# Patient Record
Sex: FEMALE | Race: BLACK OR AFRICAN AMERICAN | NOT HISPANIC OR LATINO | Employment: OTHER | URBAN - METROPOLITAN AREA
[De-identification: names, ages, dates, MRNs, and addresses within clinical notes are randomized per-mention and may not be internally consistent; named-entity substitution may affect disease eponyms.]

---

## 2023-09-28 PROBLEM — M25.50 JOINT PAIN: Status: ACTIVE | Noted: 2023-09-28

## 2023-09-28 PROBLEM — R06.02 SHORTNESS OF BREATH: Status: ACTIVE | Noted: 2023-09-28

## 2023-09-28 PROBLEM — J34.89 SINUS PRESSURE: Status: ACTIVE | Noted: 2023-09-28

## 2023-09-28 PROBLEM — R82.90 ABNORMAL URINE FINDINGS: Status: ACTIVE | Noted: 2023-09-28

## 2023-09-28 PROBLEM — R68.89 COLD INTOLERANCE: Status: ACTIVE | Noted: 2023-09-28

## 2023-09-28 PROBLEM — R53.83 FATIGUE: Status: ACTIVE | Noted: 2023-09-28

## 2023-09-28 PROBLEM — M79.606 LEG PAIN: Status: ACTIVE | Noted: 2023-09-28

## 2023-09-28 PROBLEM — R26.9 IMPAIRED GAIT: Status: ACTIVE | Noted: 2023-09-28

## 2023-09-28 PROBLEM — N81.6 RECTOCELE, FEMALE: Status: ACTIVE | Noted: 2023-09-28

## 2023-09-28 PROBLEM — N18.9 CHRONIC RENAL INSUFFICIENCY: Status: ACTIVE | Noted: 2023-09-28

## 2023-09-28 PROBLEM — Z87.39 HISTORY OF NECK PAIN: Status: ACTIVE | Noted: 2023-09-28

## 2023-09-28 PROBLEM — F32.A DEPRESSION: Status: ACTIVE | Noted: 2023-09-28

## 2023-09-28 PROBLEM — Z82.49 FAMILY HISTORY OF CORONARY ARTERY DISEASE: Status: ACTIVE | Noted: 2023-09-28

## 2023-09-28 PROBLEM — G62.9 PERIPHERAL NEUROPATHY: Status: ACTIVE | Noted: 2023-09-28

## 2023-09-28 PROBLEM — R10.31 GROIN PAIN, RIGHT: Status: ACTIVE | Noted: 2023-09-28

## 2023-09-28 PROBLEM — R32 URINARY INCONTINENCE: Status: ACTIVE | Noted: 2023-09-28

## 2023-09-28 PROBLEM — G90.9 PERIPHERAL AUTONOMIC NEUROPATHY: Status: ACTIVE | Noted: 2023-09-28

## 2023-09-28 PROBLEM — E66.3 OVERWEIGHT: Status: ACTIVE | Noted: 2023-09-28

## 2023-09-28 PROBLEM — R41.3 MEMORY LOSS: Status: ACTIVE | Noted: 2023-09-28

## 2023-09-28 PROBLEM — R26.89 BALANCE PROBLEM: Status: ACTIVE | Noted: 2023-09-28

## 2023-09-28 RX ORDER — VIT C/E/ZN/COPPR/LUTEIN/ZEAXAN 250MG-90MG
25 CAPSULE ORAL
COMMUNITY
Start: 2019-03-18

## 2023-09-28 RX ORDER — DONEPEZIL HYDROCHLORIDE 5 MG/1
1 TABLET, FILM COATED ORAL NIGHTLY
COMMUNITY
Start: 2022-08-15

## 2023-09-28 RX ORDER — ALBUTEROL SULFATE 90 UG/1
1 INHALANT RESPIRATORY (INHALATION) EVERY 6 HOURS
COMMUNITY
Start: 2022-08-15

## 2023-09-28 RX ORDER — ACETAMINOPHEN 500 MG
2 TABLET ORAL EVERY 6 HOURS
COMMUNITY
Start: 2022-08-15

## 2023-09-28 RX ORDER — THIAMINE HCL 50 MG
1 TABLET ORAL DAILY
COMMUNITY
Start: 2022-08-15

## 2023-09-28 RX ORDER — HYDROCHLOROTHIAZIDE 25 MG/1
1 TABLET ORAL DAILY
COMMUNITY

## 2023-09-28 RX ORDER — ASPIRIN 325 MG
1 TABLET ORAL DAILY
COMMUNITY
Start: 2022-08-15

## 2023-09-28 RX ORDER — ATORVASTATIN CALCIUM 40 MG/1
1 TABLET, FILM COATED ORAL NIGHTLY
COMMUNITY

## 2023-10-03 ENCOUNTER — TREATMENT (OUTPATIENT)
Dept: PHYSICAL THERAPY | Facility: CLINIC | Age: 83
End: 2023-10-03
Payer: COMMERCIAL

## 2023-10-03 DIAGNOSIS — R10.31 GROIN PAIN, RIGHT: ICD-10-CM

## 2023-10-03 DIAGNOSIS — R26.9 IMPAIRED GAIT: Primary | ICD-10-CM

## 2023-10-03 PROCEDURE — 97530 THERAPEUTIC ACTIVITIES: CPT | Mod: GP

## 2023-10-03 PROCEDURE — 97110 THERAPEUTIC EXERCISES: CPT | Mod: GP

## 2023-10-03 ASSESSMENT — PAIN SCALES - GENERAL: PAINLEVEL_OUTOF10: 0 - NO PAIN

## 2023-10-03 ASSESSMENT — ENCOUNTER SYMPTOMS
OCCASIONAL FEELINGS OF UNSTEADINESS: 1
LOSS OF SENSATION IN FEET: 0
DEPRESSION: 1

## 2023-10-03 ASSESSMENT — PAIN - FUNCTIONAL ASSESSMENT: PAIN_FUNCTIONAL_ASSESSMENT: 0-10

## 2023-10-03 NOTE — PROGRESS NOTES
Physical Therapy    Physical Therapy Treatment    Patient Name: Marta Butler  MRN: [id number]  Today's Date: 10/3/2023  Time Calculation  Start Time: 1255  Stop Time: 1340  Time Calculation (min): 45 min  Visit 2    Assessment:   Patient returns for first follow up visit after insurance approval for follow up visits. Initiated therapeutic exercises; patient appropriately challenged. Patient ambulates into clinic carrying cane. Gait training and therapeutic exercise for safe mobility. Plan to assess between session carry over at next visit of gait mechanics and sit to stand safety at next visit.    Plan:    Continue per PT POC. Progress therapeutic exercises as patient tolerates.    Current Problem  1. Impaired gait        2. Groin pain, right            Subjective   Using cane mostly when out of the house.     Pain  Pain Assessment: 0-10  Pain Score: 0 - No pain  Pain Type:  (sharp; R groin when moves wrong)    Objective   Ambulates into clinic holding cane    Treatments:  Therapeutic Exercise x 30 mins:  Scifit stepper x 5 mins ROM/warm-up/strength  Seated heel/toe raises 2 x 15 reps  Seated knee extension 2 x 15 reps ea  Seated hip adduction with ball 2 x 10 reps  Seated clamshell with red TB 2 x 10 reps  Seated march with red TB x 10 reps  Therapeutic Activity x 15 mins: Safety and mechanics with functional sit to stand transfer. Sit to stand transfer with cues for scooting to edge of chair, anterior weight shift, use of hands for support. Stand to sit with control of descent into chair, hand support. Proper sequencing with cane during overground ambulation with Good patient return demonstration 2 x 40 ft.    OP EDUCATION: HEP LE strengthening with red TB and handout provided.       Goals:  Encounter Problems       Encounter Problems (Active)       PT Problem       PT Goal 1       Start:  10/03/23    Expected End:  11/30/23       We are continuing the therapy plan of care and goals that were documented in the  legacy EMR.  Please refer to the PT plan of care in the EMR for treatment plan and goals.

## 2023-10-20 ENCOUNTER — TREATMENT (OUTPATIENT)
Dept: PHYSICAL THERAPY | Facility: CLINIC | Age: 83
End: 2023-10-20
Payer: COMMERCIAL

## 2023-10-20 DIAGNOSIS — R26.9 IMPAIRED GAIT: Primary | ICD-10-CM

## 2023-10-20 DIAGNOSIS — R10.31 GROIN PAIN, RIGHT: ICD-10-CM

## 2023-10-20 PROCEDURE — 97110 THERAPEUTIC EXERCISES: CPT | Mod: GP,CQ

## 2023-10-20 ASSESSMENT — PAIN DESCRIPTION - DESCRIPTORS: DESCRIPTORS: ACHING

## 2023-10-20 ASSESSMENT — PAIN - FUNCTIONAL ASSESSMENT: PAIN_FUNCTIONAL_ASSESSMENT: 0-10

## 2023-10-20 ASSESSMENT — PAIN SCALES - GENERAL: PAINLEVEL_OUTOF10: 7

## 2023-10-20 NOTE — PROGRESS NOTES
Physical Therapy    Physical Therapy Treatment    Patient Name: Marta Butler  MRN: [id number]  Today's Date: 10/20/2023  Time Calculation  Start Time: 0155  Stop Time: 0230  Time Calculation (min): 35 min  Visit 3    Assessment:  PT Assessment  Evaluation/Treatment Tolerance: Patient tolerated treatment well  During seated hip adduction exercise patient demonstrated muscular shaking upon release, had some tolerable discomfort through the right groin area, rated this pain at a 5/10. Upon rotating to the left while right leg was weightbearing patient had a sharp increase in painful symptoms, rates this at a 8/10 and lasted for only a couple seconds. Vocal cueing for sit to stands to perform properly with starting position, trunk leaning and UE support.     Plan:    Continue per PT POC. Progress therapeutic exercises as patient tolerates.    Current Problem  1. Impaired gait        2. Groin pain, right          Subjective   Arrives to her therapy session 10 minutes late. Currently with around 7/10 pain through the right hip/groin region. States that most of her pain is during movement/standing up, while at rest the pain usually very minimal. With higher pain levels the patient will take Tylenol. No other complaints at this time.     **Impaired gait, groin pain, right     Pain  Pain Assessment: 0-10  Pain Score: 7  Pain Type: Chronic pain  Pain Location: Hip  Pain Orientation: Right  Pain Descriptors: Aching  Pain Frequency: Intermittent    Objective     Treatments:  Therapeutic Exercise x 45min  Scifit stepper x 5 mins ROM/warm-up/strength  Seated Toe raises x 20 reps  Standing Heel raise 1 x 10 reps  Seated knee extension 2 x 15 reps ea  Seated hip adduction with ball 2 x 10 reps  Seated clamshell with red TB 2 x 10 reps  Seated march with red TB x 10 reps  STS from mat table x 10 (required vocal cueing for proper form)    OP EDUCATION: HEP LE strengthening with red TB and handout provided.     Goals:  Active       PT  Problem       PT Goal 1       Start:  10/03/23    Expected End:  11/30/23       We are continuing the therapy plan of care and goals that were documented in the legacy EMR.  Please refer to the PT plan of care in the EMR for treatment plan and goals.

## 2023-10-25 ENCOUNTER — TREATMENT (OUTPATIENT)
Dept: PHYSICAL THERAPY | Facility: CLINIC | Age: 83
End: 2023-10-25
Payer: COMMERCIAL

## 2023-10-25 DIAGNOSIS — R26.9 IMPAIRED GAIT: Primary | ICD-10-CM

## 2023-10-25 DIAGNOSIS — R10.31 GROIN PAIN, RIGHT: ICD-10-CM

## 2023-10-25 PROCEDURE — 97110 THERAPEUTIC EXERCISES: CPT | Mod: GP

## 2023-10-25 PROCEDURE — 97116 GAIT TRAINING THERAPY: CPT | Mod: GP

## 2023-10-25 ASSESSMENT — ENCOUNTER SYMPTOMS
OCCASIONAL FEELINGS OF UNSTEADINESS: 1
DEPRESSION: 0
LOSS OF SENSATION IN FEET: 0

## 2023-10-25 NOTE — PROGRESS NOTES
Physical Therapy    Physical Therapy Treatment    Patient Name: Marta Butler  MRN: [id number]  Today's Date: 10/25/2023  Time Calculation  Start Time: 0100  Stop Time: 0200  Time Calculation (min): 60 min  Visit 4    Assessment:   Gait training with assistive device: walker within clinic, as patient demonstrates unsteadiness with use of single point cane. After initial gait training, patient demonstrates improvement in safety with mechanics and step length with use of walker compared to cane. Will need to ensure carry over at next visit. Encouraged daily HEP compliance to maximize benefits of therapy and mobility. Improvement of body mechanics with sit to stand transfers. Patient reports feeling better, stretched at end of treatment, no report of pain.    Plan:    Continue per PT POC. Progress therapeutic exercises as patient tolerates.    Current Problem  1. Impaired gait        2. Groin pain, right            Subjective   Uncomfortable with the change in the weather, not pain, just soreness. Unable to rate the discomfort on a pain scale.  Discomfort R hip/groin let up since the first day of therapy.   Feels better after doing the exercises, but busy around the house. Hopes to get them in daily, does them when she can.    **Impaired gait, groin pain, right     Pain   Unable to rate discomfort, no pain    Objective   Steadi Fall Risk: 7  Shorted step length ambulating into clinic with single point cane    Treatments:  Therapeutic Exercise x 45min  Scifit stepper x 5 mins ROM/warm-up/strength  Seated Toe raises x 20 reps  Standing Heel raise x 10 reps, x 20 reps  Seated knee extension 1# x 20 reps ea  Seated hip adduction with ball 2 x 15 reps  Seated clamshell with red TB 2 x 15 reps  Seated march with red TB 2 x 15 reps  Functional sit to stand from chair x 10  Gait Training x 15 minutes: Ambulation overground in clinic with wheeled walker, rollator walker and transfers focusing in walker safety, proper use; heel  strike and toe off gait pattern. Ambulation out of clinic with single point cane focusing on increased step length.    OP EDUCATION: Increase repetitions with HEP     Goals:  Active       PT Problem       PT Goal 1       Start:  10/03/23    Expected End:  11/30/23       We are continuing the therapy plan of care and goals that were documented in the legacy EMR.  Please refer to the PT plan of care in the EMR for treatment plan and goals.

## 2023-10-26 NOTE — PROGRESS NOTES
Physical Therapy    Physical Therapy Treatment    Patient Name: Marta Butler  MRN: [id number]  Today's Date: 10/26/2023     Visit 4    Assessment:       Gait training with assistive device: walker within clinic, as patient demonstrates unsteadiness with use of single point cane. After initial gait training, patient demonstrates improvement in safety with mechanics and step length with use of walker compared to cane. Will need to ensure carry over at next visit. Encouraged daily HEP compliance to maximize benefits of therapy and mobility. Improvement of body mechanics with sit to stand transfers. Patient reports feeling better, stretched at end of treatment, no report of pain.    Plan:    Continue per PT POC. Progress therapeutic exercises as patient tolerates.    Current Problem  No diagnosis found.      Subjective       Uncomfortable with the change in the weather, not pain, just soreness. Unable to rate the discomfort on a pain scale.  Discomfort R hip/groin let up since the first day of therapy.   Feels better after doing the exercises, but busy around the house. Hopes to get them in daily, does them when she can.    **Impaired gait, groin pain, right     Pain   Unable to rate discomfort, no pain    Objective   Steadi Fall Risk: 7  Shorted step length ambulating into clinic with single point cane    Treatments:  Therapeutic Exercise x 45min  Scifit stepper x 5 mins ROM/warm-up/strength  Seated Toe raises x 20 reps  Standing Heel raise x 10 reps, x 20 reps  Seated knee extension 1# x 20 reps ea  Seated hip adduction with ball 2 x 15 reps  Seated clamshell with red TB 2 x 15 reps  Seated march with red TB 2 x 15 reps  Functional sit to stand from chair x 10  Gait Training x 15 minutes: Ambulation overground in clinic with wheeled walker, rollator walker and transfers focusing in walker safety, proper use; heel strike and toe off gait pattern. Ambulation out of clinic with single point cane focusing on increased step  length.    OP EDUCATION: Increase repetitions with HEP     Goals:

## 2023-10-27 ENCOUNTER — DOCUMENTATION (OUTPATIENT)
Dept: PHYSICAL THERAPY | Facility: CLINIC | Age: 83
End: 2023-10-27
Payer: COMMERCIAL

## 2023-10-27 ENCOUNTER — APPOINTMENT (OUTPATIENT)
Dept: PHYSICAL THERAPY | Facility: CLINIC | Age: 83
End: 2023-10-27
Payer: COMMERCIAL

## 2023-10-27 NOTE — PROGRESS NOTES
Physical Therapy                 Therapy Communication Note    Patient Name: Marta Butler  MRN: [id number]  Today's Date: 10/27/2023     Discipline: Physical Therapy    Missed Time: Cancel

## 2023-11-01 ENCOUNTER — TREATMENT (OUTPATIENT)
Dept: PHYSICAL THERAPY | Facility: CLINIC | Age: 83
End: 2023-11-01
Payer: COMMERCIAL

## 2023-11-01 DIAGNOSIS — R26.9 IMPAIRED GAIT: Primary | ICD-10-CM

## 2023-11-01 DIAGNOSIS — R10.31 GROIN PAIN, RIGHT: ICD-10-CM

## 2023-11-01 PROCEDURE — 97110 THERAPEUTIC EXERCISES: CPT | Mod: GP

## 2023-11-01 PROCEDURE — 97116 GAIT TRAINING THERAPY: CPT | Mod: GP

## 2023-11-01 NOTE — PROGRESS NOTES
Physical Therapy    Physical Therapy Treatment    Patient Name: Marta Butler  MRN: [id number]  Today's Date: 11/1/2023  Time Calculation  Start Time: 0145  Stop Time: 0245  Time Calculation (min): 60 min  Visit 5    Assessment:   Patient returns after recent sickness. Encouraged gradual resumption of HEP and re-instructed on use of an assistive device with all functional mobility to increase safety and decrease risk for falls. Increased resistance to progress strengthening to patients tolerance. Continued gait training with assistive device: walker within clinic, as patient demonstrates unsteadiness with use of single point cane. Improvement in safety with mechanics and step length with use of walker compared to cane. No pain at end of treatment.    Plan:    Continue per PT POC. Progress therapeutic exercises as patient tolerates, standing next.    Current Problem  1. Impaired gait        2. Groin pain, right            Subjective   Feels better since recent sickness, been resting.  No pain today, 0/10.  R groin pain comes and goes, no worse.  Doesn't use cane all the time, but does reach for nearby furniture at home.    **Impaired gait, groin pain, right    Objective   Steadi Fall Risk: 7    Treatments:  Therapeutic Exercise x 45 minutes:  Scifit stepper x 5 mins ROM/warm-up/strength  Seated heel/toe raises x 30 reps  Standing heel/toe raises 2 x 10 reps  Seated knee extension 2# x 10 reps, 3# x 20 reps ea  Seated hip adduction with ball 2 x 15 reps  Seated clamshell with red TB 2 x 15 reps  Seated march with red TB 2 x 15 reps  Functional sit to stand from chair x 10  Gait Training x 15 minutes: Ambulation overground in clinic with wheeled walker, rollator walker and transfers focusing in walker safety, proper use; heel strike and toe off gait pattern, as well as foot clearance due to shuffled steps. Ambulation out of clinic with single point cane focusing on increased step length.    OP EDUCATION: Increase  repetitions with HEP     Goals:  Active       PT Problem       PT Goal 1       Start:  10/03/23    Expected End:  11/30/23       We are continuing the therapy plan of care and goals that were documented in the legacy EMR.  Please refer to the PT plan of care in the EMR for treatment plan and goals.

## 2023-11-03 ENCOUNTER — TREATMENT (OUTPATIENT)
Dept: PHYSICAL THERAPY | Facility: CLINIC | Age: 83
End: 2023-11-03
Payer: COMMERCIAL

## 2023-11-03 DIAGNOSIS — R10.31 GROIN PAIN, RIGHT: ICD-10-CM

## 2023-11-03 DIAGNOSIS — R26.9 IMPAIRED GAIT: Primary | ICD-10-CM

## 2023-11-03 PROCEDURE — 97110 THERAPEUTIC EXERCISES: CPT | Mod: GP

## 2023-11-03 PROCEDURE — 97116 GAIT TRAINING THERAPY: CPT | Mod: GP

## 2023-11-03 NOTE — PROGRESS NOTES
Physical Therapy    Physical Therapy Treatment    Patient Name: Marta Butler  MRN: [id number]  Today's Date: 11/3/2023  Time Calculation  Start Time: 0118  Stop Time: 0215  Time Calculation (min): 57 min  Visit 6     Assessment:   Anterior R hip stretch with progression of R hip extension, as well as R hip discomfort. Focused on part practice followed by forward/retro ambulation. Progressed with functional sit to stand with reduced handrail support to no handrail support, while maintaining balance. Will need to ensure carry over of step length at next visit during overground ambulation.    Plan:    Continue per PT POC. Will need to ensure carry over of step length at next visit during overground ambulation.    Current Problem  1. Impaired gait        2. Groin pain, right            Subjective   No pain today, 0/10.  R groin pain 6/10 at worst, lasts a minute or so.  Doesn't use cane all the time, but does reach for nearby furniture at home.    Objective   Steadi Fall Risk: 7    Treatments:  Therapeutic Exercise x 45 minutes:  Scifit stepper x 7 mins ROM/warm-up/strength  Lateral stepping 5 x 8ft  Standing heel/toe raises 3 x 10 reps  Standing 3 way hip x 10 reps ea  Seated knee extension 3# x 20 reps ea  Seated march with 3# 2 x 10 reps  Seated hip adduction with ball x 30 reps  Seated clamshell with green TB 2 x 15 reps  Functional sit to stand from chair 2 x 10 reps with/without handrail support  Gait Training x 12 minutes: // bars part practice B handrail support forward stepping focusing on R hip extension, increase L step length. // bars with bilateral, unilateral handrail support, overground ambulation focusing on increasing L step length to improve gait pattern forward/retro ambulation, followed by overground ambulation with cane maintaining comparable step length    OP EDUCATION: Increase repetitions with HEP     Goals:  Active       PT Problem       PT Goal 1       Start:  10/03/23    Expected End:  11/30/23        We are continuing the therapy plan of care and goals that were documented in the legacy EMR.  Please refer to the PT plan of care in the EMR for treatment plan and goals.

## 2023-11-08 ENCOUNTER — APPOINTMENT (OUTPATIENT)
Dept: PHYSICAL THERAPY | Facility: CLINIC | Age: 83
End: 2023-11-08
Payer: COMMERCIAL

## 2023-11-08 ENCOUNTER — DOCUMENTATION (OUTPATIENT)
Dept: PHYSICAL THERAPY | Facility: CLINIC | Age: 83
End: 2023-11-08
Payer: COMMERCIAL

## 2023-11-08 NOTE — PROGRESS NOTES
Physical Therapy                 Therapy Communication Note    Patient Name: Marta Butler  MRN: [id number]  Today's Date: 11/8/2023     Discipline: Physical Therapy    Missed Visit Reason:  Patient called and canceled today's therapy session secondary to being sick and coughing.     Missed Time: Cancel    Comment:

## 2023-11-10 ENCOUNTER — APPOINTMENT (OUTPATIENT)
Dept: PHYSICAL THERAPY | Facility: CLINIC | Age: 83
End: 2023-11-10
Payer: COMMERCIAL

## 2023-11-15 ENCOUNTER — TREATMENT (OUTPATIENT)
Dept: PHYSICAL THERAPY | Facility: CLINIC | Age: 83
End: 2023-11-15
Payer: COMMERCIAL

## 2023-11-15 DIAGNOSIS — R10.31 GROIN PAIN, RIGHT: ICD-10-CM

## 2023-11-15 DIAGNOSIS — R26.9 IMPAIRED GAIT: Primary | ICD-10-CM

## 2023-11-15 PROCEDURE — 97110 THERAPEUTIC EXERCISES: CPT | Mod: GP

## 2023-11-15 NOTE — PROGRESS NOTES
Physical Therapy    Physical Therapy Treatment    Patient Name: Marta Butler  MRN: [id number]  Today's Date: 11/15/2023  Time Calculation  Start Time: 0100  Stop Time: 0145  Time Calculation (min): 45 min  Visit 6     Assessment:   Progressed with therapeutic exercises in standing. Verbal cues and demonstration for proper sequencing and correct use of cane with L hand during overground ambulation. Cues to improve L step length to allow R hip extension. Encouraged patient to resume HEP after recent pause due to illness/allergies.    Plan:    Continue per PT POC. Ensure carry over of step length at next visit during overground ambulation and proper use of cane.    Current Problem  1. Impaired gait        2. Groin pain, right            Subjective   Feeling better since allergies last week with the weather cancel, had to cancel appointment.  No pain today, just stiffness rated 6/10.  No pain greater than 6/10 R groin pain  No falls or near misses since last visit.  Saddened due to older sister on hospice.    Objective   Steadi Fall Risk: 7  Ambulates Mod Indep with single point cane    Treatments:  Therapeutic Exercise x 45 minutes:  Scifit stepper S13 x 8 mins ROM/warm-up/strength  Standing heel/toe raises x 30 reps on airex  Toe tap on step 2 x 10 reps ea unilateal to no handrail support  Standing 3 way hip 2 x 10 reps ea  Lateral stepping 5 x 8ft, no UE support  Step over orange obstacle and return x 8 reps ea  Mod Indep ambulation overground with cane maintaining comparable step length, cues for proper sequencing and correct use of cane with L hand  Not today:  Seated knee extension 3# x 20 reps ea  Seated march with 3# 2 x 10 reps  Seated hip adduction with ball x 30 reps  Seated clamshell with green TB 2 x 15 reps  Functional sit to stand from chair 2 x 10 reps with/without handrail support  Supine DKTC with PB    OP EDUCATION: Resume HEP after recent illness     Goals:  Active       PT Problem       PT Goal 1        Start:  10/03/23    Expected End:  11/30/23       We are continuing the therapy plan of care and goals that were documented in the legacy EMR.  Please refer to the PT plan of care in the EMR for treatment plan and goals.

## 2023-11-17 ENCOUNTER — TREATMENT (OUTPATIENT)
Dept: PHYSICAL THERAPY | Facility: CLINIC | Age: 83
End: 2023-11-17
Payer: COMMERCIAL

## 2023-11-17 DIAGNOSIS — R26.9 IMPAIRED GAIT: Primary | ICD-10-CM

## 2023-11-17 DIAGNOSIS — R10.31 GROIN PAIN, RIGHT: ICD-10-CM

## 2023-11-17 PROCEDURE — 97110 THERAPEUTIC EXERCISES: CPT | Mod: GP,CQ

## 2023-11-17 NOTE — PROGRESS NOTES
"Physical Therapy    Physical Therapy Treatment    Patient Name: Marta Butler  MRN: [id number]  Today's Date: 11/17/2023  Time Calculation  Start Time: 0100  Stop Time: 0145  Time Calculation (min): 45 min  Visit 7    Assessment:   During hip extension exercise and while on the recumbent stepper patient had a tolerable but uncomfortable sensation in the right hip flexor/groin areas, Demonstrates fair amount of tightness in the right hip when performing supine butterfly stretching as well as during hip flexor stretching, both tolerable. Finishes session with a positive attitude as she feels her right hip has loosened up and no current pain level. No complaints when finishing session.     Plan:    Continue per PT POC. Ensure carry over of step length at next visit during overground ambulation and proper use of cane.    Current Problem  1. Impaired gait        2. Groin pain, right          Subjective   Reports no pain through the right groin area at arrival, although states being fatigued and not feeling \"up to par\".  States she has been able to put her shoes on recently as well as activities around her house have gotten easier since starting therapy. Patient was sick last week with a sinus infection so compliance with her home exercise program has been low, started her HEP yesterday; No issues with any exercises at home. No other complaints at this time.     Objective   Steadi Fall Risk: 7  Ambulates Mod Indep with single point cane    Treatments:  Therapeutic Exercise x 45 minutes:  Scifit stepper S13 x 8 mins ROM/warm-up/strength  Standing heel/toe raises x 30 reps on airex  Lunge on step x 20  Toe tap on step 2 x 10 reps ea unilateal to no handrail support  Standing 3 way hip 2 x 10 reps ea  Lateral stepping 5 x 8ft, no UE support  Step over orange obstacle and return x 8 reps ea  Supine butterfly stretching 3 x 30 sec  Hip flexor stretching 2 x 20 sec  Mod Indep ambulation overground with cane maintaining comparable " step length, cues for proper sequencing and correct use of cane with L hand    Not today:  Seated knee extension 3# x 20 reps ea  Seated march with 3# 2 x 10 reps  Seated hip adduction with ball x 30 reps  Seated clamshell with green TB 2 x 15 reps  Functional sit to stand from chair 2 x 10 reps with/without handrail support  Supine DKTC with PB    OP EDUCATION: Resume HEP after recent illness     Goals:  Active       PT Problem       PT Goal 1       Start:  10/03/23    Expected End:  11/30/23       We are continuing the therapy plan of care and goals that were documented in the legacy EMR.  Please refer to the PT plan of care in the EMR for treatment plan and goals.

## 2023-11-22 ENCOUNTER — TREATMENT (OUTPATIENT)
Dept: PHYSICAL THERAPY | Facility: CLINIC | Age: 83
End: 2023-11-22
Payer: COMMERCIAL

## 2023-11-22 DIAGNOSIS — R26.9 IMPAIRED GAIT: Primary | ICD-10-CM

## 2023-11-22 DIAGNOSIS — R10.31 GROIN PAIN, RIGHT: ICD-10-CM

## 2023-11-22 PROCEDURE — 97110 THERAPEUTIC EXERCISES: CPT | Mod: GP

## 2023-11-22 NOTE — PROGRESS NOTES
Physical Therapy    Physical Therapy Treatment    Patient Name: Marta Butler  MRN: [id number]  Today's Date: 11/22/2023  Time Calculation  Start Time: 0100  Stop Time: 0200  Time Calculation (min): 60 min  Visit 8    Assessment:   Progressed with therapeutic exercises with no increase in symptoms. Appropriately challenged with static balance in stable, unstable surfaces. Good carry over of step length during overground ambulation with proper use of cane. Patient is progressing towards PT goals, plan for goal review next.    Plan:    Goal review next.    Current Problem  1. Impaired gait        2. Groin pain, right            Subjective   Stiffness, achy today with the weather. Current pain level: 0/10.  Starting back with exercises since recent illness, hard to get started, but feels better once she starts.    Objective   Steadi Fall Risk: 7  Ambulates Mod Indep with single point cane    Treatments:  Therapeutic Exercise x 60 minutes:  Scifit stepper S13 x 8 mins ROM/warm-up/strength  Standing heel/toe raises x 30 reps on airex  Standing 3 way hip x 10 reps ea, airex with UE support  Alt hip flexion on airex 2 x 10 reps ea  Airex EO/EC, EO head turns horizontal, vertical  Semitandem, tandem stance stable surface  Toe tap on step and hold, unstable surface x 10 reps ea unilateal to no handrail support  Step over orange obstable and return fwd, lateral ea  Lunge on step x 20  Seated adductor stretch 3 x 30 sec ea  Seated hamstring stretch 3 x 30 sec ea  Lateral stepping 5 x 8ft, no UE support    OP EDUCATION: Continue with HEP, increase repetitions as able     Goals:  Active       PT Problem       PT Goal 1       Start:  10/03/23    Expected End:  11/30/23       We are continuing the therapy plan of care and goals that were documented in the legacy EMR.  Please refer to the PT plan of care in the EMR for treatment plan and goals.

## 2023-11-29 ENCOUNTER — DOCUMENTATION (OUTPATIENT)
Dept: PHYSICAL THERAPY | Facility: CLINIC | Age: 83
End: 2023-11-29
Payer: COMMERCIAL

## 2023-11-29 ENCOUNTER — APPOINTMENT (OUTPATIENT)
Dept: PHYSICAL THERAPY | Facility: CLINIC | Age: 83
End: 2023-11-29
Payer: COMMERCIAL